# Patient Record
Sex: MALE | Race: WHITE | Employment: OTHER | URBAN - METROPOLITAN AREA
[De-identification: names, ages, dates, MRNs, and addresses within clinical notes are randomized per-mention and may not be internally consistent; named-entity substitution may affect disease eponyms.]

---

## 2024-04-04 ENCOUNTER — TELEPHONE (OUTPATIENT)
Dept: BREAST CENTER | Age: 64
End: 2024-04-04

## 2024-04-04 NOTE — TELEPHONE ENCOUNTER
RN made first attempt to schedule patient for consult with [facility] breast clinic. Pt was referred to office for Left clear nipple D/C and pruritus x 1 month by Dr.William Root.  Pt voicemail was not set up so unable to leave a message.       Patient PCP ordered a left diag mammo, check to see if had preformed and if so where?    Patient needs scheduled for NEW PATIENT-Left clear nipple D/C and pruritus x 1 month, family hx breast CA sister  if had imaging done. If no imaging done need to start with that.     Electronically signed by Heather Jamil RN on 4/4/24 at 10:09 AM EDT

## 2024-05-09 ENCOUNTER — OFFICE VISIT (OUTPATIENT)
Dept: BREAST CENTER | Age: 64
End: 2024-05-09
Payer: COMMERCIAL

## 2024-05-09 VITALS
TEMPERATURE: 98.2 F | WEIGHT: 191 LBS | RESPIRATION RATE: 14 BRPM | SYSTOLIC BLOOD PRESSURE: 138 MMHG | DIASTOLIC BLOOD PRESSURE: 80 MMHG | BODY MASS INDEX: 28.95 KG/M2 | HEART RATE: 56 BPM | OXYGEN SATURATION: 98 % | HEIGHT: 68 IN

## 2024-05-09 DIAGNOSIS — N64.52 NIPPLE DISCHARGE: Primary | ICD-10-CM

## 2024-05-09 PROCEDURE — 99203 OFFICE O/P NEW LOW 30 MIN: CPT | Performed by: SURGERY

## 2024-05-09 PROCEDURE — 3017F COLORECTAL CA SCREEN DOC REV: CPT | Performed by: SURGERY

## 2024-05-09 PROCEDURE — 4004F PT TOBACCO SCREEN RCVD TLK: CPT | Performed by: SURGERY

## 2024-05-09 PROCEDURE — G8419 CALC BMI OUT NRM PARAM NOF/U: HCPCS | Performed by: SURGERY

## 2024-05-09 PROCEDURE — G8427 DOCREV CUR MEDS BY ELIG CLIN: HCPCS | Performed by: SURGERY

## 2024-05-09 NOTE — PROGRESS NOTES
MEDICATIONS    Current Outpatient Medications:     atorvastatin (LIPITOR) 10 MG tablet, TAKE 1 TABLET BY MOUTH EVERY DAY, Disp: 30 tablet, Rfl: 4    ondansetron (ZOFRAN-ODT) 4 MG disintegrating tablet, Take 1 tablet by mouth 3 times daily as needed for Nausea or Vomiting, Disp: 21 tablet, Rfl: 0    ALLERGIES  Allergies   Allergen Reactions    Bactrim [Sulfamethoxazole-Trimethoprim]      dizzy       SOCIAL HISTORY   reports that he has never smoked. He has never used smokeless tobacco. He reports that he does not drink alcohol and does not use drugs.    PHYSICAL EXAMINATION  /80 (Site: Right Upper Arm)   Pulse 56   Temp 98.2 °F (36.8 °C) (Temporal)   Resp 14   Ht 1.727 m (5' 8\")   Wt 86.6 kg (191 lb)   SpO2 98%   BMI 29.04 kg/m²     COMPREHENSIVE BREAST EXAMINATION  There are no cervical, supraclavicular, or infraclavicular lymph nodes palpable.    Inspection of the breast bilaterally demonstrate there to be no secondary signs of malignancy.  There are no lesions intrinsically of the nipple areolar complex.  No spontaneous discharges witnessed.    Palpation of the axilla bilaterally is without adenopathy.    Palpation of the right breast demonstrates no focal findings.    Palpation of the left breast does demonstrate a slight fullness behind the nipple areolar complex.    With manipulation today no discharge was witnessed on the left side.    BREAST IMAGING STUDIES  I did review the recent left breast mammogram and ultrasound.  I believe the mammographic findings are most consistent with gynecomastia.  I can appreciate no masses asymmetries or calcifications.    PATHOLOGY  None    ASSESSMENT AND PLAN  Remberto Nichols was in the office today for consultation regarding symptomatology of the left breast.    I had a discussion today with Remberto regarding the diagnosis as well as recommended treatment options.  I did spend 30 minutes on the visit over half of which was dedicated education counseling

## 2024-11-11 ENCOUNTER — OFFICE VISIT (OUTPATIENT)
Dept: BREAST CENTER | Age: 64
End: 2024-11-11
Payer: COMMERCIAL

## 2024-11-11 VITALS
WEIGHT: 195 LBS | SYSTOLIC BLOOD PRESSURE: 138 MMHG | DIASTOLIC BLOOD PRESSURE: 78 MMHG | OXYGEN SATURATION: 98 % | TEMPERATURE: 97.7 F | RESPIRATION RATE: 20 BRPM | HEART RATE: 59 BPM | BODY MASS INDEX: 29.55 KG/M2 | HEIGHT: 68 IN

## 2024-11-11 DIAGNOSIS — N64.52 NIPPLE DISCHARGE IN MALE: Primary | ICD-10-CM

## 2024-11-11 PROCEDURE — 4004F PT TOBACCO SCREEN RCVD TLK: CPT | Performed by: SURGERY

## 2024-11-11 PROCEDURE — 99203 OFFICE O/P NEW LOW 30 MIN: CPT | Performed by: SURGERY

## 2024-11-11 PROCEDURE — G8419 CALC BMI OUT NRM PARAM NOF/U: HCPCS | Performed by: SURGERY

## 2024-11-11 PROCEDURE — 3017F COLORECTAL CA SCREEN DOC REV: CPT | Performed by: SURGERY

## 2024-11-11 PROCEDURE — 1123F ACP DISCUSS/DSCN MKR DOCD: CPT | Performed by: SURGERY

## 2024-11-11 PROCEDURE — G8484 FLU IMMUNIZE NO ADMIN: HCPCS | Performed by: SURGERY

## 2024-11-11 PROCEDURE — 99212 OFFICE O/P EST SF 10 MIN: CPT | Performed by: SURGERY

## 2024-11-11 PROCEDURE — G8427 DOCREV CUR MEDS BY ELIG CLIN: HCPCS | Performed by: SURGERY

## 2024-11-11 NOTE — PATIENT INSTRUCTIONS
Date of visit: 11/11/2024    Breast History  Remberto Nichols was in the office for clinical follow-up.    Remberto was evaluated in May for gynecomastia.  This was associated with nipple discharge as well as pruritus.    It is notable that the patient had a workup for gynecomastia in 2019.  At the time of his visit earlier this year the imaging abnormalities had actually improved.  His clinical exam was benign.  I suggested clinical follow-up.    Breast Symptoms  Remberto noted no significant events until 2 weeks ago.  He has redeveloped left clear nipple discharge.  He does note that there is some persistent swelling and tenderness.  He also reports again the pruritus.    Breast Examination  There are no cervical, supraclavicular, or infraclavicular lymph nodes palpable.    Inspection of the chest bilaterally does demonstrate an asymmetry with the left chest being larger than the right.    On close inspection there is somewhat of a scaly rash on the left areola but not the nipple.    Palpation of the axilla bilaterally is without adenopathy.    Palpation of the right breast demonstrates no focal findings.    Palpation of the left breast does demonstrate a central fullness.  I believe this is unchanged from his prior visit.    Breast Imaging  Remberto has had no updated imaging.    Assessment/Plan  Remberto Nichols is in the office today for ongoing evaluation of left gynecomastia.    I informed Remberto that it is really the discharge that I believe we should focus on.    I again reviewed his case with him where in 2019 his laboratory data was negative.  I informed him that his mammogram from this year actually showed less prominent tissue.    I informed him that I believe he needs to be reimaged to completely exclude malignancy.  I shared with him that for nipple discharge, and he does have a history of bloody discharge on 1 event, requires MRI.    I have placed an order for bilateral diagnostic mammography and

## 2024-11-11 NOTE — PROGRESS NOTES
ultrasound.  Assuming there are no irregularities then the patient will have a breast MRI.  If in fact there were no worrisome findings then I believe it is time to repeat his lab evaluations and consider a punch biopsy of the areola.      This note was created with voice recognition software.  Please excuse any grammatical errors that were not corrected and please contact me if there are any questions.    [email or web address]  [phone]

## 2024-12-05 ENCOUNTER — HOSPITAL ENCOUNTER (OUTPATIENT)
Dept: GENERAL RADIOLOGY | Age: 64
Discharge: HOME | End: 2024-12-07
Payer: MEDICARE

## 2024-12-05 VITALS — WEIGHT: 186 LBS | BODY MASS INDEX: 28.19 KG/M2 | HEIGHT: 68 IN

## 2024-12-05 DIAGNOSIS — N64.52 NIPPLE DISCHARGE: Primary | ICD-10-CM

## 2024-12-05 DIAGNOSIS — N64.52 NIPPLE DISCHARGE IN MALE: ICD-10-CM

## 2024-12-05 PROCEDURE — 76642 ULTRASOUND BREAST LIMITED: CPT

## 2024-12-05 PROCEDURE — G0279 TOMOSYNTHESIS, MAMMO: HCPCS

## 2025-01-27 ENCOUNTER — TELEPHONE (OUTPATIENT)
Dept: BREAST CENTER | Age: 65
End: 2025-01-27

## 2025-01-28 ENCOUNTER — TELEPHONE (OUTPATIENT)
Dept: BREAST CENTER | Age: 65
End: 2025-01-28

## 2025-01-28 DIAGNOSIS — M79.5 FOREIGN BODY (FB) IN SOFT TISSUE: Primary | ICD-10-CM

## 2025-01-28 NOTE — TELEPHONE ENCOUNTER
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 He does not feel he had any injury at the time or metal enter his eye and states he was wearing glasses. Touched base with breast MRI tech, Jasmyne, who said patient will need a 2 view Xray of the orbits to check for a foreign body the day prior to the scheduled MRI.    Patient notified to go to the nearest [facility] facility (likely [place]) to have this xray completed the day before his MRI. He verbalized his understanding and will do this prior.

## 2025-02-03 ENCOUNTER — HOSPITAL ENCOUNTER (OUTPATIENT)
Dept: GENERAL RADIOLOGY | Age: 65
Discharge: HOME | End: 2025-02-05
Payer: MEDICARE

## 2025-02-03 ENCOUNTER — HOSPITAL ENCOUNTER (OUTPATIENT)
Age: 65
Discharge: HOME | End: 2025-02-05
Payer: MEDICARE

## 2025-02-03 DIAGNOSIS — M79.5 FOREIGN BODY (FB) IN SOFT TISSUE: ICD-10-CM

## 2025-02-03 PROCEDURE — 70030 X-RAY EYE FOR FOREIGN BODY: CPT

## 2025-02-04 ENCOUNTER — HOSPITAL ENCOUNTER (OUTPATIENT)
Dept: MRI IMAGING | Age: 65
Discharge: HOME | End: 2025-02-06
Payer: MEDICARE

## 2025-02-04 DIAGNOSIS — N64.52 NIPPLE DISCHARGE: ICD-10-CM

## 2025-02-04 PROCEDURE — A9585 GADOBUTROL INJECTION: HCPCS | Performed by: RADIOLOGY

## 2025-02-04 PROCEDURE — C8908 MRI W/O FOL W/CONT, BREAST,: HCPCS

## 2025-02-04 PROCEDURE — 6360000004 HC RX CONTRAST MEDICATION: Performed by: RADIOLOGY

## 2025-02-04 RX ORDER — GADOBUTROL 604.72 MG/ML
8 INJECTION INTRAVENOUS
Status: COMPLETED | OUTPATIENT
Start: 2025-02-04 | End: 2025-02-04

## 2025-02-04 RX ADMIN — GADOBUTROL 8 ML: 604.72 INJECTION INTRAVENOUS at 07:51

## 2025-02-06 DIAGNOSIS — N63.25 UNSPECIFIED LUMP IN THE LEFT BREAST, OVERLAPPING QUADRANTS: ICD-10-CM

## 2025-02-06 DIAGNOSIS — N64.52 NIPPLE DISCHARGE IN MALE: Primary | ICD-10-CM

## 2025-02-06 NOTE — RESULT ENCOUNTER NOTE
Order taken to [facility] schedulers to contact patient. Will also continue to follow.   Please page IMTS once this patient is on the floor.     Please contact the cross cover pager ([place] - )    Nathan Irving MD  Internal Medicine PGY-II  Pager [phone]

## 2025-02-10 ENCOUNTER — TELEPHONE (OUTPATIENT)
Dept: BREAST CENTER | Age: 65
End: 2025-02-10

## 2025-02-10 NOTE — TELEPHONE ENCOUNTER
RN received voicemail from patient in regards to question for . RN contacted patient to see if it was something could help with and patient requested to speak to . RN advised message would be routed to .       Patient can be reached at [phone]      Electronically signed by Heather Houser RN on 2/10/25 at 1:51 PM EST

## 2025-02-13 ENCOUNTER — TELEPHONE (OUTPATIENT)
Dept: BREAST CENTER | Age: 65
End: 2025-02-13

## 2025-02-13 ENCOUNTER — HOSPITAL ENCOUNTER (OUTPATIENT)
Dept: GENERAL RADIOLOGY | Age: 65
Discharge: HOME | End: 2025-02-15
Attending: SURGERY
Payer: MEDICARE

## 2025-02-13 DIAGNOSIS — N63.25 UNSPECIFIED LUMP IN THE LEFT BREAST, OVERLAPPING QUADRANTS: ICD-10-CM

## 2025-02-13 DIAGNOSIS — N64.52 NIPPLE DISCHARGE IN MALE: ICD-10-CM

## 2025-02-13 PROCEDURE — 76642 ULTRASOUND BREAST LIMITED: CPT

## 2025-02-13 NOTE — TELEPHONE ENCOUNTER
RN contacted patient to schedule follow up. RN offered 02/25/2025 and 03/04/2025. Patient picked 03/04/2025 @ 8am with .      Electronically signed by Heather Houser RN on 2/13/25 at 3:44 PM EST

## 2025-02-13 NOTE — TELEPHONE ENCOUNTER
----- Message from Dr. Eb Mayo MD sent at 2/13/2025  3:18 PM EST -----  Discussed neg US with Tab.  I suggested lumpectomy to exclude DCIS as a cause for the discharge.  Please schedule office visit in the next few weeks

## 2025-03-03 ENCOUNTER — TELEPHONE (OUTPATIENT)
Dept: BREAST CENTER | Age: 65
End: 2025-03-03

## 2025-03-03 NOTE — TELEPHONE ENCOUNTER
RN received a call from patient in regards to upcoming appointment with . Patient states he is unable to make appointment and not able to reschedule at this time. Patient states he will call office back when he is able to reschedule appointment. RN verbalized understanding.         Electronically signed by Heather Houser RN on 3/3/25 at 10:29 AM EST

## 2025-03-31 ENCOUNTER — TELEPHONE (OUTPATIENT)
Dept: BREAST CENTER | Age: 65
End: 2025-03-31

## 2025-03-31 NOTE — TELEPHONE ENCOUNTER
----- Message from TISH BELL RN sent at 3/3/2025 10:29 AM EST -----  Patient was schedule with  3/4/25 and had to cancel. Patient stated he would call back to reschedule. Check to see if patient call and rescheduled.         Electronically signed by Heather Houser RN on 3/3/25 at 10:33 AM EST

## 2025-03-31 NOTE — TELEPHONE ENCOUNTER
RN contacted patient to see if he wanted to reschedule his cancelled appointment. Patient states he's doing good and wishes not to reschedule at this time. RN verbalized understanding.         Electronically signed by Heather Houser RN on 3/31/25 at 1:43 PM EDT

## 2025-04-03 LAB
ALBUMIN: 4.2 G/DL (ref 3.5–5.2)
ALP BLD-CCNC: 89 U/L (ref 40–129)
ALT SERPL-CCNC: 28 U/L (ref 0–40)
ANION GAP SERPL CALCULATED.3IONS-SCNC: 20 MMOL/L (ref 7–16)
AST SERPL-CCNC: 31 U/L (ref 0–39)
BASOPHILS ABSOLUTE: 0.1 K/UL (ref 0–0.2)
BASOPHILS RELATIVE PERCENT: 1 % (ref 0–2)
BILIRUB SERPL-MCNC: 0.6 MG/DL (ref 0–1.2)
BUN BLDV-MCNC: 14 MG/DL (ref 6–23)
CALCIUM SERPL-MCNC: 9.4 MG/DL (ref 8.6–10.2)
CHLORIDE BLD-SCNC: 108 MMOL/L (ref 98–107)
CHOLESTEROL, TOTAL: 201 MG/DL
CO2: 16 MMOL/L (ref 22–29)
CREAT SERPL-MCNC: 0.9 MG/DL (ref 0.7–1.2)
EOSINOPHILS ABSOLUTE: 0.86 K/UL (ref 0.05–0.5)
EOSINOPHILS RELATIVE PERCENT: 12 % (ref 0–6)
GFR, ESTIMATED: >90 ML/MIN/1.73M2
GLUCOSE BLD-MCNC: 90 MG/DL (ref 74–99)
HCT VFR BLD CALC: 47 % (ref 37–54)
HDLC SERPL-MCNC: 40 MG/DL
HEMOGLOBIN: 16.3 G/DL (ref 12.5–16.5)
IMMATURE GRANULOCYTES %: 1 % (ref 0–5)
IMMATURE GRANULOCYTES ABSOLUTE: 0.05 K/UL (ref 0–0.58)
LDL CHOLESTEROL: 134 MG/DL
LYMPHOCYTES ABSOLUTE: 1.46 K/UL (ref 1.5–4)
LYMPHOCYTES RELATIVE PERCENT: 21 % (ref 20–42)
MCH RBC QN AUTO: 32.3 PG (ref 26–35)
MCHC RBC AUTO-ENTMCNC: 34.7 G/DL (ref 32–34.5)
MCV RBC AUTO: 93.3 FL (ref 80–99.9)
MONOCYTES ABSOLUTE: 0.66 K/UL (ref 0.1–0.95)
MONOCYTES RELATIVE PERCENT: 9 % (ref 2–12)
NEUTROPHILS ABSOLUTE: 3.99 K/UL (ref 1.8–7.3)
NEUTROPHILS RELATIVE PERCENT: 56 % (ref 43–80)
PDW BLD-RTO: 13.2 % (ref 11.5–15)
PLATELET # BLD: 277 K/UL (ref 130–450)
PMV BLD AUTO: 10.8 FL (ref 7–12)
POTASSIUM SERPL-SCNC: 4.2 MMOL/L (ref 3.5–5)
RBC # BLD: 5.04 M/UL (ref 3.8–5.8)
SODIUM BLD-SCNC: 144 MMOL/L (ref 132–146)
TOTAL PROTEIN: 6.6 G/DL (ref 6.4–8.3)
TRIGL SERPL-MCNC: 133 MG/DL
VITAMIN D 25-HYDROXY: 25.8 NG/ML (ref 30–100)
VLDLC SERPL CALC-MCNC: 27 MG/DL
WBC # BLD: 7.1 K/UL (ref 4.5–11.5)

## 2025-04-04 LAB
BILIRUBIN, URINE: NEGATIVE
COLOR, UA: YELLOW
COMMENT: NORMAL
GLUCOSE URINE: NEGATIVE MG/DL
KETONES, URINE: NEGATIVE MG/DL
LEUKOCYTE ESTERASE, URINE: NEGATIVE
MUCUS: PRESENT
NITRITE, URINE: NEGATIVE
PH, URINE: 6 (ref 5–8)
PROTEIN UA: NEGATIVE MG/DL
RBC UA: NORMAL /HPF
SPECIFIC GRAVITY UA: 1.02 (ref 1–1.03)
TURBIDITY: CLEAR
URINE HGB: NEGATIVE
UROBILINOGEN, URINE: 0.2 EU/DL (ref 0–1)
WBC UA: NORMAL /HPF

## 2025-04-06 LAB
PROSTATE SPECIFIC ANTIGEN FREE: 0.4 NG/ML
PROSTATE SPECIFIC ANTIGEN PERCENT FREE: 57 %
PROSTATE SPECIFIC ANTIGEN: 0.7 NG/ML (ref 0–4)

## 2025-07-09 LAB
25(OH)D3 SERPL-MCNC: 37.4 NG/ML (ref 30–100)
CHOLEST SERPL-MCNC: 234 MG/DL
HDLC SERPL-MCNC: 39 MG/DL
LDLC SERPL CALC-MCNC: 168 MG/DL
TRIGL SERPL-MCNC: 133 MG/DL
VLDLC SERPL CALC-MCNC: 27 MG/DL